# Patient Record
Sex: FEMALE | Race: BLACK OR AFRICAN AMERICAN
[De-identification: names, ages, dates, MRNs, and addresses within clinical notes are randomized per-mention and may not be internally consistent; named-entity substitution may affect disease eponyms.]

---

## 2019-12-19 ENCOUNTER — HOSPITAL ENCOUNTER (OUTPATIENT)
Dept: HOSPITAL 62 - LC | Age: 24
Discharge: HOME | End: 2019-12-19
Attending: OBSTETRICS & GYNECOLOGY
Payer: MEDICAID

## 2019-12-19 DIAGNOSIS — Z3A.35: ICD-10-CM

## 2019-12-19 DIAGNOSIS — O47.03: Primary | ICD-10-CM

## 2019-12-19 LAB
ADD MANUAL DIFF: NO
APPEARANCE UR: (no result)
APTT PPP: YELLOW S
BACTERIA (WET MOUNT): (no result)
BARBITURATES UR QL SCN: NEGATIVE
BASOPHILS # BLD AUTO: 0 10^3/UL (ref 0–0.2)
BASOPHILS NFR BLD AUTO: 0.2 % (ref 0–2)
BILIRUB UR QL STRIP: NEGATIVE
CHLAM PCR: NOT DETECTED
EOSINOPHIL # BLD AUTO: 0 10^3/UL (ref 0–0.6)
EOSINOPHIL NFR BLD AUTO: 0.3 % (ref 0–6)
EPITHELIALS (WET MOUNT): (no result)
ERYTHROCYTE [DISTWIDTH] IN BLOOD BY AUTOMATED COUNT: 15.2 % (ref 11.5–14)
GLUCOSE UR STRIP-MCNC: NEGATIVE MG/DL
HCT VFR BLD CALC: 29.5 % (ref 36–47)
HGB BLD-MCNC: 9.8 G/DL (ref 12–15.5)
KETONES UR STRIP-MCNC: NEGATIVE MG/DL
LYMPHOCYTES # BLD AUTO: 1.4 10^3/UL (ref 0.5–4.7)
LYMPHOCYTES NFR BLD AUTO: 13.6 % (ref 13–45)
MCH RBC QN AUTO: 24.6 PG (ref 27–33.4)
MCHC RBC AUTO-ENTMCNC: 33.1 G/DL (ref 32–36)
MCV RBC AUTO: 74 FL (ref 80–97)
METHADONE UR QL SCN: NEGATIVE
MONOCYTES # BLD AUTO: 0.8 10^3/UL (ref 0.1–1.4)
MONOCYTES NFR BLD AUTO: 7.4 % (ref 3–13)
NEUTROPHILS # BLD AUTO: 8.1 10^3/UL (ref 1.7–8.2)
NEUTS SEG NFR BLD AUTO: 78.5 % (ref 42–78)
NITRITE UR QL STRIP: NEGATIVE
PCP UR QL SCN: NEGATIVE
PH UR STRIP: 6 [PH] (ref 5–9)
PLATELET # BLD: 265 10^3/UL (ref 150–450)
PROT UR STRIP-MCNC: NEGATIVE MG/DL
RBC # BLD AUTO: 3.97 10^6/UL (ref 3.72–5.28)
SP GR UR STRIP: 1.01
T.VAGINALIS (WET MOUNT): (no result)
TOTAL CELLS COUNTED % (AUTO): 100 %
URINE AMPHETAMINES SCREEN: NEGATIVE
URINE BENZODIAZEPINES SCREEN: NEGATIVE
URINE COCAINE SCREEN: NEGATIVE
URINE MARIJUANA (THC) SCREEN: (no result)
UROBILINOGEN UR-MCNC: NEGATIVE MG/DL (ref ?–2)
WBC # BLD AUTO: 10.4 10^3/UL (ref 4–10.5)
WBCS (WET MOUNT): (no result)
YEAST (WET MOUNT): (no result)

## 2019-12-19 PROCEDURE — 86900 BLOOD TYPING SEROLOGIC ABO: CPT

## 2019-12-19 PROCEDURE — G0480 DRUG TEST DEF 1-7 CLASSES: HCPCS

## 2019-12-19 PROCEDURE — 4A1HXCZ MONITORING OF PRODUCTS OF CONCEPTION, CARDIAC RATE, EXTERNAL APPROACH: ICD-10-PCS | Performed by: OBSTETRICS & GYNECOLOGY

## 2019-12-19 PROCEDURE — 86850 RBC ANTIBODY SCREEN: CPT

## 2019-12-19 PROCEDURE — 59025 FETAL NON-STRESS TEST: CPT

## 2019-12-19 PROCEDURE — 87491 CHLMYD TRACH DNA AMP PROBE: CPT

## 2019-12-19 PROCEDURE — 87340 HEPATITIS B SURFACE AG IA: CPT

## 2019-12-19 PROCEDURE — 86901 BLOOD TYPING SEROLOGIC RH(D): CPT

## 2019-12-19 PROCEDURE — 85025 COMPLETE CBC W/AUTO DIFF WBC: CPT

## 2019-12-19 PROCEDURE — 93976 VASCULAR STUDY: CPT

## 2019-12-19 PROCEDURE — 86762 RUBELLA ANTIBODY: CPT

## 2019-12-19 PROCEDURE — 87081 CULTURE SCREEN ONLY: CPT

## 2019-12-19 PROCEDURE — 36415 COLL VENOUS BLD VENIPUNCTURE: CPT

## 2019-12-19 PROCEDURE — 87210 SMEAR WET MOUNT SALINE/INK: CPT

## 2019-12-19 PROCEDURE — 80307 DRUG TEST PRSMV CHEM ANLYZR: CPT

## 2019-12-19 PROCEDURE — 80349 CANNABINOIDS NATURAL: CPT

## 2019-12-19 PROCEDURE — 86592 SYPHILIS TEST NON-TREP QUAL: CPT

## 2019-12-19 PROCEDURE — 76805 OB US >/= 14 WKS SNGL FETUS: CPT

## 2019-12-19 PROCEDURE — 87591 N.GONORRHOEAE DNA AMP PROB: CPT

## 2019-12-19 PROCEDURE — 81001 URINALYSIS AUTO W/SCOPE: CPT

## 2019-12-19 NOTE — NON STRESS TEST REPORT
=================================================================

Non Stress Test

=================================================================

Datetime Report Generated by CPN: 12/19/2019 17:26

   

   

=================================================================

DEMOGRAPHIC

=================================================================

   

Test Number:  2

Test Number:  1

EGA NST:  35.5

EGA NST:  35.5

   

=================================================================

INDICATION

=================================================================

   

Indication for Study (NST) Other:  False Labor

Indication for Study (NST) Other:  Ordered by provider LC

   

=================================================================

VITAL SIGNS

=================================================================

   

Temperature - NST:  99.4

Pulse - NST:  108

RESP - NST:  18

NBPSYS NST:  126

NBPDIA NST:  57

   

=================================================================

MONITORING

=================================================================

   

Monitor Explained:  Monitor Explained; Test Explained; Patient

   Verbalized Understanding

Monitor Explained:  Monitor Explained; Test Explained; Patient

   Verbalized Understanding

Time on Monitor:  12/19/2019 17:00

Time on Monitor:  12/19/2019 15:00

Time off Monitor:  12/19/2019 17:20

NST Duration:  20

   

=================================================================

NST INTERVENTIONS

=================================================================

   

NST Interventions:  PO Hydration

NST Interventions:  PO Hydration

Physician Notified NST:  Dr Younger

BABY A:  [id number]

   

=================================================================

BABY A

=================================================================

   

Fetal Movement :  Present

Fetal Movement :  Present

Contraction Frequency :  none

FHR Baseline :  140

Accelerations :  15X15

Accelerations :  15X15

Decelerations :  None

Variability :  Moderate 6-25bpm

Variability :  Moderate 6-25bpm

NST Review:  Meets Criteria for Reactive NST

NST Review and Verified By :  ОЛЕГ Kirk

NST Results:  Reactive

   

=================================================================

NST REPORT

=================================================================

   

Report Trigger:  Send Report

## 2019-12-19 NOTE — RADIOLOGY REPORT (SQ)
EXAM DESCRIPTION:  U/S OB 14+ TA/1 GEST W/DOPPLER



COMPLETED DATE/TIME:  12/19/2019 4:53 pm



REASON FOR STUDY:  iup no prenatal care h/o c/s 24 weeks



COMPARISON:  None.



TECHNIQUE:  Static and Dynamic grayscale imaging performed of gravid uterus using transabdominal appr
oach.  Additional selected color Doppler and spectral images recorded.  All stored on PACS.



LIMITATIONS:  None.



FINDINGS:  FETUSES SEEN:1

EGA: 36 weeks 0 days  Calculated using BPD,FL,HC,AC documented on images. No discrepancy with clinica
l dates.

YUE: 1/16/2022

EFW: 2,834 grams

PERCENTILE: 51st percentile

DIANA: Total DIANA 15 cm

PLACENTA: Anterior  grade 2

FETAL PRESENTATION: Cephalic.

FETAL ANATOMY:

FETAL HEART RATE: 149 beats per minute.

FOUR CHAMBER HEART: Visualized.

THREE VESSEL CORD: Yes.

CORD INSERTION: Visualized.

KIDNEYS AND BLADDER: Visualized. Appear normal.

STOMACH: Visualized. Appears normal.

SPINE: Normal as visualized.

BRAIN AND LATERAL VENTRICLES: Not well seen due to fetal orientation.

OTHER: No other significant finding.

MATERNAL ADNEXA: Maternal ovaries not visualized.

CERVICAL LENGTH: 4 cm   Closed.

OTHER: No other significant finding.



IMPRESSION:  LIVING INTRAUTERINE PREGNANCY.

ESTIMATED GESTATIONAL AGE 36 weeks 0 days

NO VISUALIZED ANOMALIES.

Trimester of pregnancy: Third trimester - 28 weeks to delivery.



TECHNICAL DOCUMENTATION:  JOB ID:  [id number]

 2011 [employer]- All Rights Reserved



Reading location - IP/workstation name: [email or web address]

## 2020-01-06 ENCOUNTER — HOSPITAL ENCOUNTER (INPATIENT)
Dept: HOSPITAL 62 - LC | Age: 25
LOS: 3 days | Discharge: HOME | End: 2020-01-09
Attending: OBSTETRICS & GYNECOLOGY | Admitting: OBSTETRICS & GYNECOLOGY
Payer: MEDICAID

## 2020-01-06 DIAGNOSIS — Z3A.38: ICD-10-CM

## 2020-01-06 DIAGNOSIS — D64.9: ICD-10-CM

## 2020-01-06 DIAGNOSIS — O34.211: Primary | ICD-10-CM

## 2020-01-06 DIAGNOSIS — Z87.891: ICD-10-CM

## 2020-01-06 LAB
ADD MANUAL DIFF: NO
APPEARANCE UR: (no result)
APTT PPP: YELLOW S
BACTERIA (WET MOUNT): (no result)
BARBITURATES UR QL SCN: NEGATIVE
BASOPHILS # BLD AUTO: 0 10^3/UL (ref 0–0.2)
BASOPHILS NFR BLD AUTO: 0.5 % (ref 0–2)
BILIRUB UR QL STRIP: NEGATIVE
CHLAM PCR: NOT DETECTED
EOSINOPHIL # BLD AUTO: 0 10^3/UL (ref 0–0.6)
EOSINOPHIL NFR BLD AUTO: 0.3 % (ref 0–6)
EPITHELIALS (WET MOUNT): (no result)
ERYTHROCYTE [DISTWIDTH] IN BLOOD BY AUTOMATED COUNT: 15.9 % (ref 11.5–14)
GLUCOSE UR STRIP-MCNC: NEGATIVE MG/DL
HCT VFR BLD CALC: 29.5 % (ref 36–47)
HGB BLD-MCNC: 9.9 G/DL (ref 12–15.5)
KETONES UR STRIP-MCNC: NEGATIVE MG/DL
LYMPHOCYTES # BLD AUTO: 2.1 10^3/UL (ref 0.5–4.7)
LYMPHOCYTES NFR BLD AUTO: 22.1 % (ref 13–45)
MCH RBC QN AUTO: 24.2 PG (ref 27–33.4)
MCHC RBC AUTO-ENTMCNC: 33.5 G/DL (ref 32–36)
MCV RBC AUTO: 72 FL (ref 80–97)
METHADONE UR QL SCN: NEGATIVE
MONOCYTES # BLD AUTO: 0.6 10^3/UL (ref 0.1–1.4)
MONOCYTES NFR BLD AUTO: 5.9 % (ref 3–13)
NEUTROPHILS # BLD AUTO: 6.7 10^3/UL (ref 1.7–8.2)
NEUTS SEG NFR BLD AUTO: 71.2 % (ref 42–78)
NITRITE UR QL STRIP: NEGATIVE
PCP UR QL SCN: NEGATIVE
PH UR STRIP: 7 [PH] (ref 5–9)
PLATELET # BLD: 258 10^3/UL (ref 150–450)
PROT UR STRIP-MCNC: NEGATIVE MG/DL
RBC # BLD AUTO: 4.1 10^6/UL (ref 3.72–5.28)
RBCS (WET MOUNT): (no result)
SP GR UR STRIP: 1
T.VAGINALIS (WET MOUNT): (no result)
TOTAL CELLS COUNTED % (AUTO): 100 %
URINE AMPHETAMINES SCREEN: NEGATIVE
URINE BENZODIAZEPINES SCREEN: NEGATIVE
URINE COCAINE SCREEN: NEGATIVE
URINE MARIJUANA (THC) SCREEN: NEGATIVE
UROBILINOGEN UR-MCNC: NEGATIVE MG/DL (ref ?–2)
WBC # BLD AUTO: 9.4 10^3/UL (ref 4–10.5)
WBCS (WET MOUNT): (no result)
YEAST (WET MOUNT): (no result)

## 2020-01-06 PROCEDURE — 36415 COLL VENOUS BLD VENIPUNCTURE: CPT

## 2020-01-06 PROCEDURE — 87591 N.GONORRHOEAE DNA AMP PROB: CPT

## 2020-01-06 PROCEDURE — 87210 SMEAR WET MOUNT SALINE/INK: CPT

## 2020-01-06 PROCEDURE — 86901 BLOOD TYPING SEROLOGIC RH(D): CPT

## 2020-01-06 PROCEDURE — 86920 COMPATIBILITY TEST SPIN: CPT

## 2020-01-06 PROCEDURE — 94760 N-INVAS EAR/PLS OXIMETRY 1: CPT

## 2020-01-06 PROCEDURE — 85027 COMPLETE CBC AUTOMATED: CPT

## 2020-01-06 PROCEDURE — 86804 HEP C AB TEST CONFIRM: CPT

## 2020-01-06 PROCEDURE — 85025 COMPLETE CBC W/AUTO DIFF WBC: CPT

## 2020-01-06 PROCEDURE — 86701 HIV-1ANTIBODY: CPT

## 2020-01-06 PROCEDURE — 86592 SYPHILIS TEST NON-TREP QUAL: CPT

## 2020-01-06 PROCEDURE — 86850 RBC ANTIBODY SCREEN: CPT

## 2020-01-06 PROCEDURE — 10907ZC DRAINAGE OF AMNIOTIC FLUID, THERAPEUTIC FROM PRODUCTS OF CONCEPTION, VIA NATURAL OR ARTIFICIAL OPENING: ICD-10-PCS | Performed by: OBSTETRICS & GYNECOLOGY

## 2020-01-06 PROCEDURE — 87491 CHLMYD TRACH DNA AMP PROBE: CPT

## 2020-01-06 PROCEDURE — 4A1HXCZ MONITORING OF PRODUCTS OF CONCEPTION, CARDIAC RATE, EXTERNAL APPROACH: ICD-10-PCS | Performed by: OBSTETRICS & GYNECOLOGY

## 2020-01-06 PROCEDURE — 81005 URINALYSIS: CPT

## 2020-01-06 PROCEDURE — 87081 CULTURE SCREEN ONLY: CPT

## 2020-01-06 PROCEDURE — 80307 DRUG TEST PRSMV CHEM ANLYZR: CPT

## 2020-01-06 PROCEDURE — 86900 BLOOD TYPING SEROLOGIC ABO: CPT

## 2020-01-06 PROCEDURE — 86803 HEPATITIS C AB TEST: CPT

## 2020-01-06 RX ADMIN — PENICILLIN G POTASSIUM SCH MLS/HR: 5000000 POWDER, FOR SOLUTION INTRAMUSCULAR; INTRAPLEURAL; INTRATHECAL; INTRAVENOUS at 14:38

## 2020-01-06 RX ADMIN — SODIUM CHLORIDE, SODIUM LACTATE, POTASSIUM CHLORIDE, AND CALCIUM CHLORIDE PRN MLS/HR: .6; .31; .03; .02 INJECTION, SOLUTION INTRAVENOUS at 19:24

## 2020-01-06 RX ADMIN — PENICILLIN G POTASSIUM SCH MLS/HR: 5000000 POWDER, FOR SOLUTION INTRAMUSCULAR; INTRAPLEURAL; INTRATHECAL; INTRAVENOUS at 18:25

## 2020-01-06 RX ADMIN — PENICILLIN G POTASSIUM SCH MLS/HR: 5000000 POWDER, FOR SOLUTION INTRAMUSCULAR; INTRAPLEURAL; INTRATHECAL; INTRAVENOUS at 22:37

## 2020-01-06 RX ADMIN — SODIUM CHLORIDE, SODIUM LACTATE, POTASSIUM CHLORIDE, AND CALCIUM CHLORIDE PRN MLS/HR: .6; .31; .03; .02 INJECTION, SOLUTION INTRAVENOUS at 11:30

## 2020-01-06 NOTE — ADMISSION PHYSICAL
=================================================================



=================================================================

Datetime Report Generated by CPN: 2020 11:53

   

   

=================================================================

CURRENT ADMISSION

=================================================================

   

Prenatal Hx Assessment:  The Prenatal History has been Reviewed and is

   Current

Chief Complaint:  Uterine Contractions

Indication for Induction:  Not Applicable

Admit Impression :  Term, Intrauterine Pregnancy; Active Labor

Admit Plan:  Initiate Labor Protocol

Admit Plan- Other:  Hx PTB , LTCS for footling breech--op note on

   file

   Hx PPD

   Cholecystectomy 

   GBS unknown

   Limited PNC

   YUE by 33 wk sono

   

=================================================================

ALLERGIES

=================================================================

   

Medication Allergies:  No

Medication Allergies:  No Known Allergies (2020)

Latex:  No Latex Allergies

   

=================================================================

OBSTETRICAL HISTORY

=================================================================

   

EDC:  2020 00:00

:  2

Para:  1

Term:  0

:  1

Livin

Cesareans:  1

Gestational Diabetes:  No

Rh Sensitization:  No

Incompetent Cervix:  No

ZARA:  No

Infertility:  No

ART Treatment:  No

Uterine Anomaly:  No

IUGR:  No

Hx Previous C/S:  Yes

Macrosomia:  No

Hx Loss/Stillborn:  No

PIH:  No

Hx  Death:  No

Placenta Previa/Abruption:  No

Depression/PP Depression:  No

PTL/PROM:  No

Post Partum Hemorrhage:  No

Current Pregnancy Procedures:  Ultrasound

Obstetrical History Comments:  -  primary c/s at 24 weeks r/t

   breech PTL 

   G2- current very limited PNC

   

=================================================================

***SEE PRENATAL RECORDS***

=================================================================

   

Alcohol:  No

Marijuana :  Yes

Cocaine:  No

Other Illicit Drugs:  No

Cigarettes:  Former Smoker. [id number]

   

=================================================================

MEDICAL HISTORY

=================================================================

   

Diabetes:  No

Blood Transfusion:  No

Pulmonary Disease (Asthma, TB):  No

Breast Disease:  No

Hypertension:  No

Gyn Surgery:  No

Heart Disease:  No

Hosp/Surgery:  Yes

Autoimmune Disorder:  No

Anesthetic Complications:  No

Kidney Disease:  No

Abnormal Pap Smear:  No

Neuro/Epilepsy:  No

Psychiatric Disorders:  No

Other Medical Diseases:  No

Hepatitis/Liver Disease:  No

Significant Family History:  No

Varicosities/Phlebitis:  No

Trauma/Violence :  No

Thyroid Dysfunction:  No

Medical History Comments:  c/s , 

   

=================================================================

INFECTIOUS HISTORY

=================================================================

   

Gonorrhea:  No

Genital Herpes:  No

Chlamydia:  No

Tuberculosis:  No

Syphilis:  No

Hepatitis:  No

HIV/AIDS Exposure:  No

Rash or Viral Illness:  No

HPV:  No

   

=================================================================

PHYSICAL EXAM

=================================================================

   

General:  Normal

HEENT:  Deferred

Neurologic:  Normal

Thyroid:  Deferred

Heart:  Normal

Lungs:  Normal

Breast:  Deferred

Back:  Normal

Abdomen:  Normal

Genitourinary Exam:  Deferred

Extremities:  Normal

DTRs:  Deferred

Pelvic Type:  Adequate

Physical Exam Comments:  cervix per Dr. Kern /3

   vertex per bedside sono by Dr. Erlin WHITE discussed with patient per Dr. Kern

Vital Signs:  Reviewed

   

=================================================================

MEMBRANES

=================================================================

   

Membranes:  Intact

   

=================================================================

FETUS A

=================================================================

   

EGA:  38.2

Monitoring:  External US

FHR- Baseline:  145

Variability:  Moderate 6-25bpm

Accelerations:  15X15

Decelerations:  None

FHR Category:  Category I

Fetal Presentation:  Vertex

Admit Comment:  GBS prophylaxix started

   epidural placed

   

=================================================================

PLANS FOR LABOR AND DELIVERY

=================================================================

   

Labor and Delivery:  None

Pain Management:  Epidural

Feeding Preference:  Formula

Benefit of Breast Feed Discussed:  Yes

Circumcision:  Yes

   

=================================================================

INFORMED CONSENT

=================================================================

   

Assignment:  Anne Marie Kern MD

Signature:  Electronically signed by Aster Robertson CNM on 2020 at

   11:53  with User ID: [email or web address]

:  Electronically signed by Aster Robertson CNM on 2020 at 11:53 

   with User ID: [email or web address]

## 2020-01-07 LAB
HCV AB SER IA-ACNC: <0.1 S/CO RATIO (ref 0–0.9)
RPR SER QL: (no result)

## 2020-01-07 PROCEDURE — 0HQ9XZZ REPAIR PERINEUM SKIN, EXTERNAL APPROACH: ICD-10-PCS | Performed by: OBSTETRICS & GYNECOLOGY

## 2020-01-07 RX ADMIN — DOCUSATE SODIUM SCH MG: 100 CAPSULE, LIQUID FILLED ORAL at 19:30

## 2020-01-07 RX ADMIN — IBUPROFEN SCH MG: 800 TABLET, FILM COATED ORAL at 22:47

## 2020-01-07 RX ADMIN — IBUPROFEN SCH MG: 800 TABLET, FILM COATED ORAL at 05:00

## 2020-01-07 RX ADMIN — Medication SCH CAP: at 09:59

## 2020-01-07 RX ADMIN — DOCUSATE SODIUM SCH MG: 100 CAPSULE, LIQUID FILLED ORAL at 09:59

## 2020-01-07 RX ADMIN — IBUPROFEN SCH MG: 800 TABLET, FILM COATED ORAL at 14:32

## 2020-01-07 RX ADMIN — FAMOTIDINE SCH MG: 20 TABLET, FILM COATED ORAL at 09:59

## 2020-01-07 RX ADMIN — FERROUS SULFATE TAB 325 MG (65 MG ELEMENTAL FE) SCH MG: 325 (65 FE) TAB at 09:59

## 2020-01-07 RX ADMIN — FERROUS SULFATE TAB 325 MG (65 MG ELEMENTAL FE) SCH MG: 325 (65 FE) TAB at 19:30

## 2020-01-07 RX ADMIN — SENNOSIDES, DOCUSATE SODIUM SCH EACH: 50; 8.6 TABLET, FILM COATED ORAL at 09:59

## 2020-01-07 RX ADMIN — PENICILLIN G POTASSIUM SCH: 5000000 POWDER, FOR SOLUTION INTRAMUSCULAR; INTRAPLEURAL; INTRATHECAL; INTRAVENOUS at 03:28

## 2020-01-07 RX ADMIN — FAMOTIDINE SCH MG: 20 TABLET, FILM COATED ORAL at 22:47

## 2020-01-07 NOTE — DELIVERY SUMMARY
=================================================================

Del Sum A-C

=================================================================

Datetime Report Generated by CPN: 2020 02:49

   

   

=================================================================

DELIVERY PERSONNEL

=================================================================

   

DELIVERY PERSONNEL:  [id number]

Delivery Doctor::  Anne Marie Kern MD

Labor and Delivery Nurse::  Sarahi Whitaker RN

Labor and Delivery Nurse::  DOUGLAS Vogel

Nursery Nurse::  Abraham Calderon RN

Nursery Nurse::  Desire Barger RN

Scrub Tech/CNA:  Lucy Green, ST

   

=================================================================

MATERNAL INFORMATION

=================================================================

   

Delivery Anesthesia:  Epidural

Medications After Delivery:  Pitocin Drip 20 Units/1000ml NSS; Cytotec

   1000mcg Per Rectum/Vagina

Estimated  Blood Loss (ml):  400

Delivery QBL:  400

Delivery QBL Comment:  400ml

Maternal Complications:  None

Provider Comments:  VMI delivered in SHERRIE presentation with no nuchal

   cord.  Shoulders and body delivered easily once maternal effort

   restored and patient began pushing with mcrobert position.  No

   dystocia.  20 sec delay due to maternal effort.  Cord doubly clamped

   and cut and infant with spontaneous cry.  Placenta delivered intact

   spontaneously.  FF at U.  Mother stable upon provider leaving the

   room and baby to NICU (but doing well after deep suctioning).  Small

   right labial laceration repaired for hemostasis.  Good hemostasis.  

   

=================================================================

LABOR SUMMARY

=================================================================

   

EDC:  2020 00:00

No. Babies in Womb:  1

 Attempted:  Yes

Labor Anesthesia:  Epidural

   

=================================================================

LABOR INFORMATION

=================================================================

   

Reason for Induction:  Not Applicable

Onset of Labor:  2020 18:52

Complete Dilatation:  2020 01:33

Cervical Ripening Agents:  Cytotec @ 1000 mcg

Oxytocin:  Augmentation

Group B Beta Strep:  1 GROUP B BETA HEMOLYTIC STREPTOCOCCUS RECOVERED

Antibiotics # of Doses:  4

Antibiotics Time of Last Dose:  2230

Name of Antibiotic Given:  penicillin

Steroids Given:  None

Reason Steroids Not Administered:  Not Applicable

   

=================================================================

MEMBRANES

=================================================================

   

Membranes Rupture Method:  Artificial

Rupture of Membranes:  2020 13:21

Length of Rupture (hr):  12.42

Amniotic Fluid Color:  Clear

Amniotic Fluid Amount:  Small

Amniotic Fluid Odor:  Normal

   

=================================================================

STAGES OF LABOR

=================================================================

   

Stage 1 hr:  6

Stage 1 min:  41

Stage 2 hr:  0

Stage 2 min:  13

Stage 3 hr:  0

Stage 3 min:  6

Total Time in Labor hr:  7

Total Time in Labor min:  0

   

=================================================================

VAGINAL DELIVERY

=================================================================

   

Episiotomy:  None

Laceration #1:  Vaginal

Laceration Extension #1:  First Degree

Laceration Repair:  Yes

Laceration Repair Note:  right labial laceration repaired in usual

   fashion.  Good hemostasis

Sponge Count Correct:  Yes

Sharps Count Correct:  Yes

   

=================================================================

CSECTION DELIVERY

=================================================================

   

Primary Indication:  N/A

Secondary Indication:  N/A

CSection Incidence:  N/A

Labor:  N/A

Elective:  N/A

CSection Incision:  N/A

   

=================================================================

BABY A INFORMATION

=================================================================

   

Infant Delivery Date/Time:  2020 01:46

Method of Delivery:  Vaginal

Born in Route :  No

:  Successful

Forceps:  N/A

Vacuum Extraction:  N/A

Shoulder Dystocia :  No

   

=================================================================

PRESENTATION/POSITION BABY A

=================================================================

   

Presentation:  Cephalic

Cephalic Presentation:  Vertex

Vertex Position:  Right Occipital Anterior

Breech Presentation:  N/A

   

=================================================================

PLACENTA INFORMATION BABY A

=================================================================

   

Placenta Delivery Time :  2020 01:52

Placenta Method of Delivery:  Spontaneous

Placenta Status:  Delivered

   

=================================================================

APGAR SCORES BABY A

=================================================================

   

Heart Rate 1 min:  >100 bpm

Resp Effort 1 min:  Slow, Irregular

Reflex Irritability 1 min:  No Response

Muscle Tone 1 min:  Flaccid

Color 1 min:  Blue/Pale

Resuscitation Effort 1 min:  Tactile Stimulation; Oxygen; PPV/NCPAP

APGAR SCORE 1 MIN:  3

Heart Rate 5 min:  >100 bpm

Resp Effort 5 min:  Slow, Irregular

Reflex Irritability 5 min:  Grimace

Muscle Tone 5 min:  Flaccid

Color 5 min:  Blue/Pale

Resuscitation Effort 5 min:  Tactile Stimulation; Oxygen; PPV/NCPAP

APGAR SCORE 5 MIN:  4

   

=================================================================

INFANT INFORMATION BABY A

=================================================================

   

Gestational Age at Delivery:  38.3

Gestational Status:  Early Term- 37- 38.6 Weeks

Infant Outcome :  Liveborn

Infant Condition :  Stable

Infant Sex:  Male

   

=================================================================

IDENTIFICATION BABY A

=================================================================

   

Infant Verification Date/Time:  2020 01:52

ID Band Number:  [id number]

Mother's Name Verified:  Yes

Infant Medical Record Number:  [id number]

RN Verifying Infant:  JNiebuhr,RN

Additional Verifying Personnel:  LParlor,CST

   

=================================================================

WEIGHT/LENGTH BABY A

=================================================================

   

Infant Birthweight (gm):  3225

Infant Weight (lb):  7

Infant Weight (oz):  2

Infant Length (in):  20.25

Infant Length (cm):  51.44

   

=================================================================

CORD INFORMATION BABY A

=================================================================

   

No. Cord Vessels:  3

Nuchal Cord :  N/A

Cord Blood Taken:  Yes-For Storage (Mom's Blood type +)

Infant Suction:  None

   

=================================================================

ASSESSMENT BABY A

=================================================================

   

Infant Complications:  Multiple Late Decels; Multiple Variable Decels

Physical Findings at Delivery:  Molding of the Head

Neonatologist/ALS Called :  No

Infant Care By:  RADHA Barger RN

Transferred To:  NICU

   

=================================================================

SIGNATURES

=================================================================

   

Signature:  Electronically signed by [facility]) on

   2020 at 02:19  with User ID: [email or web address]

:  I was personally available for consultation and serving as

   supervising physician for the MLP.

## 2020-01-08 LAB
ERYTHROCYTE [DISTWIDTH] IN BLOOD BY AUTOMATED COUNT: 15.7 % (ref 11.5–14)
HCT VFR BLD CALC: 25.2 % (ref 36–47)
HGB BLD-MCNC: 8.2 G/DL (ref 12–15.5)
MCH RBC QN AUTO: 24 PG (ref 27–33.4)
MCHC RBC AUTO-ENTMCNC: 32.5 G/DL (ref 32–36)
MCV RBC AUTO: 74 FL (ref 80–97)
PLATELET # BLD: 260 10^3/UL (ref 150–450)
RBC # BLD AUTO: 3.42 10^6/UL (ref 3.72–5.28)
WBC # BLD AUTO: 15.1 10^3/UL (ref 4–10.5)

## 2020-01-08 RX ADMIN — FERROUS SULFATE TAB 325 MG (65 MG ELEMENTAL FE) SCH MG: 325 (65 FE) TAB at 17:48

## 2020-01-08 RX ADMIN — DOCUSATE SODIUM SCH MG: 100 CAPSULE, LIQUID FILLED ORAL at 09:28

## 2020-01-08 RX ADMIN — FAMOTIDINE SCH MG: 20 TABLET, FILM COATED ORAL at 09:26

## 2020-01-08 RX ADMIN — FAMOTIDINE SCH MG: 20 TABLET, FILM COATED ORAL at 22:16

## 2020-01-08 RX ADMIN — DOCUSATE SODIUM SCH MG: 100 CAPSULE, LIQUID FILLED ORAL at 17:48

## 2020-01-08 RX ADMIN — IBUPROFEN SCH MG: 800 TABLET, FILM COATED ORAL at 15:27

## 2020-01-08 RX ADMIN — Medication SCH CAP: at 09:28

## 2020-01-08 RX ADMIN — FERROUS SULFATE TAB 325 MG (65 MG ELEMENTAL FE) SCH MG: 325 (65 FE) TAB at 09:28

## 2020-01-08 RX ADMIN — IBUPROFEN SCH MG: 800 TABLET, FILM COATED ORAL at 06:41

## 2020-01-08 RX ADMIN — IBUPROFEN SCH MG: 800 TABLET, FILM COATED ORAL at 22:17

## 2020-01-08 RX ADMIN — SENNOSIDES, DOCUSATE SODIUM SCH EACH: 50; 8.6 TABLET, FILM COATED ORAL at 09:27
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
## 2020-01-09 VITALS — DIASTOLIC BLOOD PRESSURE: 60 MMHG | SYSTOLIC BLOOD PRESSURE: 123 MMHG

## 2020-01-09 RX ADMIN — FAMOTIDINE SCH: 20 TABLET, FILM COATED ORAL at 10:37

## 2020-01-09 RX ADMIN — Medication SCH CAP: at 10:36

## 2020-01-09 RX ADMIN — SENNOSIDES, DOCUSATE SODIUM SCH EACH: 50; 8.6 TABLET, FILM COATED ORAL at 10:36

## 2020-01-09 RX ADMIN — DOCUSATE SODIUM SCH MG: 100 CAPSULE, LIQUID FILLED ORAL at 10:36

## 2020-01-09 RX ADMIN — IBUPROFEN SCH MG: 800 TABLET, FILM COATED ORAL at 05:59

## 2020-01-09 RX ADMIN — FERROUS SULFATE TAB 325 MG (65 MG ELEMENTAL FE) SCH MG: 325 (65 FE) TAB at 10:36

## 2020-01-09 NOTE — PDOC PROGRESS REPORT
Subjective-OB


Progress Note for:: 01/09/20


Subjective: 





Ready for discharge.





Physical Exam (OB)


Vital Signs: 


                                        











Temp Pulse Resp BP Pulse Ox


 


 97.7 F   70   16   120/57 L  100 


 


 01/09/20 07:48  01/09/20 07:48  01/09/20 07:48  01/09/20 07:48  01/09/20 07:48








                                 Intake & Output











 01/08/20 01/09/20 01/10/20





 06:59 06:59 06:59


 


Intake Total   340


 


Balance   340














- PIH/Pre-Eclampsia


DTR's: 1 +


Clonus: Negative


Headache: Absent


Epigastric Pain: No


Visual Changes: No





- Lochia


Lochia Amount: Scant < 10 ml


Lochia Color: Rubra/Red





- Abdomen


Description: Soft, Round


Hernia Present: No


Bowel Sounds: Normoactive


Flatus Presence: Present


Stool: No


Fundal Description: Firm, Midline


Fundal Height: u/u - u/2





Objective-Diagnostic


Laboratory: 


                                        





                                 01/08/20 07:28 





                                        





01/06/20 10:06   Vaginal/Anorectal   Group B Streptococcus Culture - Final


                            NO GROUP B STREPTOCOCCUS RECOVERED











Assessment and Plan(PN)





- Time Spent with Patient


Medications reviewed and adjusted accordingly: Yes





- Disposition


Anticipated Discharge: Home
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
